# Patient Record
Sex: MALE | Race: WHITE | NOT HISPANIC OR LATINO | Employment: FULL TIME | URBAN - METROPOLITAN AREA
[De-identification: names, ages, dates, MRNs, and addresses within clinical notes are randomized per-mention and may not be internally consistent; named-entity substitution may affect disease eponyms.]

---

## 2018-05-20 ENCOUNTER — OFFICE VISIT (OUTPATIENT)
Dept: URGENT CARE | Facility: PHYSICIAN GROUP | Age: 33
End: 2018-05-20
Payer: COMMERCIAL

## 2018-05-20 VITALS
DIASTOLIC BLOOD PRESSURE: 72 MMHG | HEIGHT: 62 IN | HEART RATE: 84 BPM | WEIGHT: 179 LBS | SYSTOLIC BLOOD PRESSURE: 128 MMHG | BODY MASS INDEX: 32.94 KG/M2 | OXYGEN SATURATION: 96 % | TEMPERATURE: 98.3 F | RESPIRATION RATE: 18 BRPM

## 2018-05-20 DIAGNOSIS — J06.9 UPPER RESPIRATORY INFECTION, VIRAL: ICD-10-CM

## 2018-05-20 DIAGNOSIS — J02.9 SORE THROAT: ICD-10-CM

## 2018-05-20 DIAGNOSIS — E66.9 OBESITY (BMI 30-39.9): ICD-10-CM

## 2018-05-20 LAB
INT CON NEG: NEGATIVE
INT CON POS: POSITIVE
S PYO AG THROAT QL: NORMAL

## 2018-05-20 PROCEDURE — 87880 STREP A ASSAY W/OPTIC: CPT | Performed by: NURSE PRACTITIONER

## 2018-05-20 PROCEDURE — 99214 OFFICE O/P EST MOD 30 MIN: CPT | Performed by: NURSE PRACTITIONER

## 2018-05-20 RX ORDER — CODEINE PHOSPHATE AND GUAIFENESIN 10; 100 MG/5ML; MG/5ML
5 SOLUTION ORAL EVERY 4 HOURS PRN
Qty: 120 ML | Refills: 0 | Status: SHIPPED | OUTPATIENT
Start: 2018-05-20 | End: 2018-05-27

## 2018-05-20 RX ORDER — FLUTICASONE PROPIONATE 50 MCG
1 SPRAY, SUSPENSION (ML) NASAL DAILY
COMMUNITY

## 2018-05-20 NOTE — PROGRESS NOTES
Subjective:     Micheal Beltran is a 32 y.o. male here today for new onset upper respiratory symptoms     This is a new problem. He and his wife were on vacation and he started getting a 'cold.' Now worsening. His biggest complaint it sore throat and unable to sleep due to cough.   Symptoms include   Cough   Congestion   Sore throat   Chills/body aches  Ear pressure      Patient denies the following symptoms:  SOB   Nausea/Vomiting   Diarrhea   Sinus pressure     Onset 4 days    Progression since onset: worsening    Fever: No   Treatments tried: Dayquil, nasal spray    Family members/coworker sick with similar symptoms: no    Past Respiratory hx: No significant past medical history  Smoker status:   History   Smoking Status   • Never Smoker   Smokeless Tobacco   • Never Used       No problem-specific Assessment & Plan notes found for this encounter.       Current medicines (including changes today)  Current Outpatient Prescriptions   Medication Sig Dispense Refill   • fluticasone (FLONASE) 50 MCG/ACT nasal spray Spray 1 Spray in nose every day.     • guaifenesin-codeine (ROBITUSSIN AC) Solution oral solution Take 5 mL by mouth every four hours as needed for Cough for up to 7 days. 120 mL 0     No current facility-administered medications for this visit.        He  has no past medical history of Asthma.    He  has no past surgical history on file.     Social History     Social History   • Marital status:      Spouse name: N/A   • Number of children: N/A   • Years of education: N/A     Social History Main Topics   • Smoking status: Never Smoker   • Smokeless tobacco: Never Used   • Alcohol use Yes      Comment: occ   • Drug use: No   • Sexual activity: Yes     Other Topics Concern   • Not on file     Social History Narrative   • No narrative on file       Family History   Problem Relation Age of Onset   • No Known Problems Mother    • No Known Problems Father          ROS  Positive for   Cough  "  Congestion   Sore throat   Chills/body aches  Ear pressure    No fever, weight loss.   No rash.   No eye redness, eye pain.   Negative for SOB, wheezing.    No chest pain, palpitations, dizziness.   No abdominal pain.     All other systems reviewed and are negative.        Objective:     Blood pressure 128/72, pulse 84, temperature 36.8 °C (98.3 °F), resp. rate 18, height 1.575 m (5' 2\"), weight 81.2 kg (179 lb), SpO2 96 %. Body mass index is 32.74 kg/m².    Physical Exam:   Constitutional: Alert, no distress. Patient not toxic or lethargic.   Eye: Equal, round and reactive, conjunctiva clear, lids normal.   ENMT: Lips without lesions, oropharynx clear.   TMs normal, without erythema.   No nasal drainage, normal appearance of external nose.   No pain with palpation of sinuses   Neck: Trachea midline, no masses. No cervical or supraclavicular lymphadenopathy  Respiratory: Unlabored respiratory effort, lungs clear to auscultation, no wheezes, no ronchi.  Cardiovascular: Normal S1, S2, no murmur, no edema.   Abdomen: Soft, non-tender, no masses, no hepatosplenomegaly. Normal bowel sounds.   Skin: Warm, dry, good turgor, no rashes in visible areas.  Psych: Alert and oriented x3, normal affect and mood.        Assessment and Plan:   The following treatment plan was discussed    1. Upper respiratory infection, viral    Advised patient to rest, increase fluids   Sinus rinse as needed for congestion   Salt water gargle for sore throat as needed   Discussed s/s to seek emergent care.  RTC if symptoms worsen or do not resolve.   Consent for opiate reviewed and signed, discussed risks of medication.   - guaifenesin-codeine (ROBITUSSIN AC) Solution oral solution; Take 5 mL by mouth every four hours as needed for Cough for up to 7 days.  Dispense: 120 mL; Refill: 0  - Consent for Opiate Prescription    2. Sore throat  Neg strep   - POCT Rapid Strep A    3. Obesity (BMI 30-39.9)  Advised to f/u with PCP   - Patient identified " as having weight management issue.  Appropriate orders and counseling given.         Reviewed indication, dosage, usage and potential adverse effects of prescribed medications. Patient appears to understand, verbalizes understanding and is willing to try medications as prescribed.      Reviewed risks and benefits of treatment plan. Patient verbally agrees to plan of care.       Followup: Return if symptoms worsen or fail to improve.    KIAH Maher.     PLEASE NOTE: This dictation was created using voice recognition software. I have made every reasonable attempt to correct obvious errors, but I expect that there may be errors of grammar and possibly content that I did not discover prior finalizing this note.

## 2018-05-20 NOTE — PATIENT INSTRUCTIONS
Your medical care was provided today by: MAICO Duenas    Thank You for the opportunity to serve you.    You may receive a brief survey in the mail shortly regarding your visit today. Please take a few moments to complete the survey and return it; no postage is necessary. We are working to serve our patient population better, improve customer service and our patients overall experience and your input can help us to accomplish this. We thank you for your help and for the opportunity to serve you today and in the future.     Special Instructions:  Always call 9-1-1 immediately if you develop a life threatening emergency.    Unless told otherwise please take all medications as directed and complete prescription therapies.     Watch for the following signs that require additional evaluation: progressive lethargy or unresponsiveness, localized pain (chest, abdomen), shortness of breath, painful breathing, progressive vomiting with weakness, bloody stools, or new rash.     If you are prescribed pain medication or any other medication that is sedating, do not take medication before or while operating a vehicle or heavy machinery or equipment due to potential side effects such as drowsiness and/or dizziness.

## 2020-01-16 ENCOUNTER — TELEPHONE (OUTPATIENT)
Dept: CARDIOLOGY | Facility: MEDICAL CENTER | Age: 35
End: 2020-01-16

## 2020-01-16 NOTE — TELEPHONE ENCOUNTER
LVM for pt about a prior cardiologist and most recent labs, with request to call back if they have ever seen someone before and where they have had their last labs at.    Faxed over request to Dr Mackay office.

## 2020-01-31 ENCOUNTER — APPOINTMENT (OUTPATIENT)
Dept: CARDIOLOGY | Facility: MEDICAL CENTER | Age: 35
End: 2020-01-31
Payer: COMMERCIAL

## 2020-02-26 PROBLEM — I25.10 CAD (CORONARY ARTERY DISEASE): Status: ACTIVE | Noted: 2020-02-26

## 2021-02-24 ENCOUNTER — APPOINTMENT (OUTPATIENT)
Dept: RADIOLOGY | Facility: IMAGING CENTER | Age: 36
End: 2021-02-24
Attending: PHYSICIAN ASSISTANT
Payer: COMMERCIAL

## 2021-02-24 ENCOUNTER — OCCUPATIONAL MEDICINE (OUTPATIENT)
Dept: URGENT CARE | Facility: CLINIC | Age: 36
End: 2021-02-24
Payer: COMMERCIAL

## 2021-02-24 VITALS
SYSTOLIC BLOOD PRESSURE: 120 MMHG | OXYGEN SATURATION: 96 % | DIASTOLIC BLOOD PRESSURE: 70 MMHG | HEART RATE: 60 BPM | BODY MASS INDEX: 32.2 KG/M2 | TEMPERATURE: 98.1 F | HEIGHT: 71 IN | RESPIRATION RATE: 18 BRPM | WEIGHT: 230 LBS

## 2021-02-24 DIAGNOSIS — S89.91XA INJURY OF RIGHT KNEE, INITIAL ENCOUNTER: ICD-10-CM

## 2021-02-24 DIAGNOSIS — S86.911A STRAIN OF RIGHT KNEE, INITIAL ENCOUNTER: ICD-10-CM

## 2021-02-24 PROCEDURE — 73564 X-RAY EXAM KNEE 4 OR MORE: CPT | Mod: TC,RT | Performed by: PHYSICIAN ASSISTANT

## 2021-02-24 PROCEDURE — 99213 OFFICE O/P EST LOW 20 MIN: CPT | Performed by: PHYSICIAN ASSISTANT

## 2021-02-24 ASSESSMENT — ENCOUNTER SYMPTOMS
SHORTNESS OF BREATH: 0
SORE THROAT: 0
EYE REDNESS: 0
FEVER: 0
HEADACHES: 0
COUGH: 0
EYE DISCHARGE: 0
NAUSEA: 0
VOMITING: 0

## 2021-02-24 NOTE — LETTER
"   [facility]  [address]  [place] [address]  Phone:  [phone] - Fax:  [phone]   [facility] Progress Report and Disability Certification  Date of Service: 2/24/2021   No Show:  No  Date / Time of Next Visit: 3/3/2021   Claim Information   Patient Name: Micheal Beltran  Claim Number:     Employer:  [employer] Date of Injury: 2/18/2021     Insurer / TPA: [employer]  ID / SSN:     Occupation:   Diagnosis: Diagnoses of Injury of right knee, initial encounter and Strain of right knee, initial encounter were pertinent to this visit.    Medical Information   Related to Industrial Injury? Yes    Subjective Complaints:    The patient presents to clinic for initial Workmen's Comp. Evaluation.    DOI: 2/18/2021 - The patient states that he was kneeling on his right knee for a short period of time.  The patient states he heard a pop to his right knee upon standing.  The patient states he did not develop immediate pain following the pop to the right knee.  The patient states he gradually developed pain to the right knee as the day progressed.  The patient reports no associated swelling to the right knee.  He reports no associated bruising or redness.  The patient states the pain to his right knee started to improve/resolved.  The patient developed recurrent pain to his right knee x2-3 days ago.  The patient states the pain to his right knee is intermittent.  The patient states the pain gradually progresses throughout the day with increased use/walking.  The patient reports increased pain with walking.  The patient states the pain to his right knee improves with rest.  The patient notes a \"clicking\" sensation to the right knee with movement.  He reports no decreased range of motion.  The patient has taken OTC ibuprofen for his current symptoms.  The patient reports a prior strain of a ligament to " the right knee in 2017.  He does not work a second job.   Objective Findings:   Right Knee:  No tenderness to palpation to the right knee.  No tenderness overlying the lateral or medial joint lines.  No tenderness overlying the patella.  No swelling.  No ecchymosis.  No erythema.  No increased warmth.  ROM intact -patient demonstrates full active range of motion of the right knee  Neurovascular intact  Strength 5/5 -flexion/extension against resistance  Normal gait   Pre-Existing Condition(s):     Assessment:   Initial Visit    Status: Additional Care Required  Permanent Disability:     Plan:      Diagnostics: X-ray    Comments:       Disability Information   Status: Released to Full Duty    From:  2/24/2021  Through: 3/3/2021 Restrictions are: Temporary   Physical Restrictions   Sitting:    Standing:    Stooping:    Bending:      Squatting:    Walking:    Climbing:    Pushing:      Pulling:    Other:    Reaching Above Shoulder (L):   Reaching Above Shoulder (R):       Reaching Below Shoulder (L):    Reaching Below Shoulder (R):      Not to exceed Weight Limits   Carrying(hrs):   Weight Limit(lb):   Lifting(hrs):   Weight  Limit(lb):     Comments:   Plan:  Full Duty without Restrictions -D39 and C4 provided   --The patient did not want light duty work restrictions at this time.  OTC NSAIDs for pain/discomfort   RICE  Wear brace for additional support  Weight-bearing as tolerated  Follow-up in 1 week for re-assessment   Return to clinic or go tot he ED if symptoms worsen or fail to improve, or if the patient should develop worsening/increasing pain/tenderness, swelling, bruising, redness or warmth to the affected area, decreased ROM, numbness, tingling or weakness, difficulty walking, fever/chills, and/or any concerning symptoms.     Repetitive Actions   Hands: i.e. Fine Manipulations from Grasping:     Feet: i.e. Operating Foot Controls:     Driving / Operate Machinery:     Provider Name:   Joceline Gay P.A.-C.  Physician Signature:  Physician Name:     Clinic Name / Location: [facility]  [address]  [place] [address] Clinic Phone Number: Dept: [phone]   Appointment Time: 12:30 Pm Visit Start Time: 1:19 PM   Check-In Time:  12:32 Pm Visit Discharge Time: 2:26 PM   Original-Treating Physician or Chiropractor    Page 2-Insurer/TPA    Page 3-Employer    Page 4-Employee

## 2021-02-24 NOTE — PROGRESS NOTES
"Subjective:      Micheal Beltran is a 35 y.o. male who presents with Knee Injury (x1wk, right knee injury, was on knee most of the day, when he got up he heard a loud pop, gets more painful towards the end of the day)        HPI  The patient presents to clinic for initial Workmen's Comp. Evaluation.    DOI: 2/18/2021 - The patient states that he was kneeling on his right knee for a short period of time.  The patient states he heard a pop to his right knee upon standing.  The patient states he did not develop immediate pain following the pop to the right knee.  The patient states he gradually developed pain to the right knee as the day progressed.  The patient reports no associated swelling to the right knee.  He reports no associated bruising or redness.  The patient states the pain to his right knee started to improve/resolved.  The patient developed recurrent pain to his right knee x2-3 days ago.  The patient states the pain to his right knee is intermittent.  The patient states the pain gradually progresses throughout the day with increased use/walking.  The patient reports increased pain with walking.  The patient states the pain to his right knee improves with rest.  The patient notes a \"clicking\" sensation to the right knee with movement.  He reports no decreased range of motion.  The patient has taken OTC ibuprofen for his current symptoms.  The patient reports a prior strain of a ligament to the right knee in 2017.  He does not work a second job.    PMH: Reviewed in Epic, no pertinent findings.  MEDS: Reviewed in Epic.  ALLERGIES: Reviewed in Epic.  SURGHX: Reviewed in Epic.  SOCHX: Reviewed in Epic.  FH: Family history was reviewed, no pertinent findings to report.      Review of Systems   Constitutional: Negative for fever.   HENT: Negative for congestion, ear pain and sore throat.    Eyes: Negative for discharge and redness.   Respiratory: Negative for cough and shortness of breath.  " "  Cardiovascular: Negative for chest pain and leg swelling.   Gastrointestinal: Negative for nausea and vomiting.   Musculoskeletal: Positive for joint pain (right knee).   Skin: Negative for rash.   Neurological: Negative for headaches.          Objective:     /70 (BP Location: Left arm, Patient Position: Sitting, BP Cuff Size: Adult)   Pulse 60   Temp 36.7 °C (98.1 °F) (Temporal)   Resp 18   Ht 1.8 m (5' 10.87\")   Wt 104 kg (230 lb)   SpO2 96%   BMI 32.20 kg/m²      Physical Exam  Constitutional:       General: He is not in acute distress.     Appearance: Normal appearance. He is well-developed. He is not ill-appearing.   HENT:      Head: Normocephalic and atraumatic.      Right Ear: External ear normal.      Left Ear: External ear normal.      Nose: Nose normal.   Eyes:      Extraocular Movements: Extraocular movements intact.      Conjunctiva/sclera: Conjunctivae normal.   Cardiovascular:      Rate and Rhythm: Normal rate.   Pulmonary:      Effort: Pulmonary effort is normal.   Musculoskeletal:      Cervical back: Normal range of motion and neck supple.      Comments:   Right Knee:  No tenderness to palpation to the right knee.  No tenderness overlying the lateral or medial joint lines.  No tenderness overlying the patella.  No swelling.  No ecchymosis.  No erythema.  No increased warmth.  ROM intact -patient demonstrates full active range of motion of the right knee  Neurovascular intact  Strength 5/5 -flexion/extension against resistance  Normal gait   Skin:     General: Skin is warm and dry.   Neurological:      Mental Status: He is alert and oriented to person, place, and time.            Progress:  Right Knee XR:   COMPARISON: None     FINDINGS:  No acute fracture, malalignment, or soft tissue abnormality.   There is very early osteophyte formation on the posterior lateral patellar facet and opposing trochlea. There is mild hypertrophy of the tibial spines. There is no joint " effusion.     IMPRESSION:  No acute findings or significant arthropathy.    Reviewed x-ray results with the patient.    Provided the patient with a hinged knee brace for additional support/stabilization     Assessment/Plan:          1. Injury of right knee, initial encounter  - DX-KNEE COMPLETE 4+ RIGHT; Future    2. Strain of right knee, initial encounter    Differential diagnoses, supportive care, and indications for immediate follow-up discussed with patient.   Instructed to return to clinic or nearest emergency department for any change in condition, further concerns, or worsening of symptoms.    Plan:  Full Duty without Restrictions -D39 and C4 provided   --The patient did not want light duty work restrictions at this time.  OTC NSAIDs for pain/discomfort   RICE  Wear brace for additional support  Weight-bearing as tolerated  Follow-up in 1 week for re-assessment   Return to clinic or go tot he ED if symptoms worsen or fail to improve, or if the patient should develop worsening/increasing pain/tenderness, swelling, bruising, redness or warmth to the affected area, decreased ROM, numbness, tingling or weakness, difficulty walking, fever/chills, and/or any concerning symptoms.     Discussed plan with the patient, and he agrees to the above.    I personally reviewed prior external notes and test results pertinent to today's visit.  I have independently reviewed and interpreted all diagnostics ordered during this urgent care visit.     Time spent evaluating this patient was at least 30 minutes and includes preparing for visit, obtaining history, exam and evaluation, ordering labs/tests/procedures/medications, independent interpretation, and counseling/education.    Please note that this dictation was created using voice recognition software. I have made every reasonable attempt to correct obvious errors, but I expect that there may be errors of grammar and possibly content that I did not discover before finalizing  the note.     This note was electronically signed by Joceline Gay PA-C

## 2021-02-24 NOTE — LETTER
"EMPLOYEE’S CLAIM FOR COMPENSATION/ REPORT OF INITIAL TREATMENT  FORM C-4    EMPLOYEE’S CLAIM - PROVIDE ALL INFORMATION REQUESTED   First Name  Micheal Last Name  Devin Birthdate                    1985                Sex  male Claim Number   Home Address  [address] Age  35 y.o. Height  1.8 m (5' 10.87\") Weight  104 kg (230 lb) [facility]     [facility]  [address] Telephone  [phone] (home)    Mailing Address  [address] [facility]  [address] Primary Language Spoken  English    Insurer   Third Party   State Compensation Ins Fund   Employee's Occupation (Job Title) When Injury or Occupational Disease Occurred      Employer's Name  [employer] Telephone   [phone], EXT: [phone]   Employer Address    [address]  [facility]   [address]   Date of Injury  2/18/2021               Hour of Injury  6:05 AM Date Employer Notified  2/18/2021 Last Day of Work after Injury     or Occupational Disease  2/24/2021 Supervisor to Whom Injury     Reported  SHAILESH FLAHERTY   Address or Location of Accident (if applicable)  [[address] [place] [address]]   What were you doing at the time of accident? (if applicable)  KNEELING DOWN, THEN STANDING UP    How did this injury or occupational disease occur? (Be specific an answer in detail. Use additional sheet if necessary)  KNEELING ON GROUND, STOOD UP AND RIGHT KNEE POPPED   If you believe that you have an occupational disease, when did you first have knowledge of the disability and it relationship to your employment?  NA Witnesses to the Accident  NA      Nature of Injury or Occupational Disease  Sprain  Part(s) of Body Injured or Affected  Knee (R), ,     I certify that the above is true and correct to the best of my knowledge and that I have provided " this information in order to obtain the benefits of [place]  I hereby authorize any physician, chiropractor, surgeon, practitioner, or other person, any hospital, including [facility] or [facility], any medical service organization, any insurance company, or other institution or organization to release to each other, any medical or other information, including benefits paid or payable, pertinent to this injury or disease, except information relative to diagnosis, treatment and/or counseling for AIDS, psychological conditions, alcohol or controlled substances, for which I must give specific authorization.  A Photostat of this authorization shall be as valid as the original.     Date   Place   Employee’s Signature   THIS REPORT MUST BE COMPLETED AND MAILED WITHIN 3 WORKING DAYS OF TREATMENT   Place  [facility]  Name of Facility  [facility]   Date  2/24/2021 Diagnosis  (S89.91XA) Injury of right knee, initial encounter  (S86.911A) Strain of right knee, initial encounter Is there evidence the injured employee was under the              influence of alcohol and/or another controlled substance at the time of accident?   Hour  1:19 PM Description of Injury or Disease  Diagnoses of Injury of right knee, initial encounter and Strain of right knee, initial encounter were pertinent to this visit. No   Treatment  Plan:  Full Duty without Restrictions -D39 and C4 provided   --The patient did not want light duty work restrictions at this time.  OTC NSAIDs for pain/discomfort   RICE  Wear brace for additional support  Weight-bearing as tolerated  Follow-up in 1 week for re-assessment   Return to clinic or go tot he ED if symptoms worsen or fail to improve, or if the patient should develop worsening/increasing pain/tenderness, swelling, bruising, redness or warmth to the affected area,  "decreased ROM, numbness, tingling or weakness, difficulty walking, fever/chills, and/or any concerning symptoms.   Have you advised the patient to remain off work five days or     more? No   X-Ray Findings  Negative   If Yes   From Date  To Date      From information given by the employee, together with medical evidence, can you directly connect this injury or occupational disease as job incurred?  Yes If No Full Duty    Yes Modified Duty      Is additional medical care by a physician indicated?  Yes If Modified Duty, Specify any Limitations / Restrictions      Do you know of any previous injury or disease contributing to this condition or occupational disease?                            No   Date  2/24/2021 Print Doctor’s Name   Lucian Gay P.A.-C. I certify the employer’s copy of  this form was mailed on:   Address  [address] Use [address]     [place]  [address]    Provider’s Tax ID Number  [id number] Telephone  Dept: [phone]      e-LUCIAN Koehler P.A.-C.  Signature:     Degree          ORIGINAL-TREATING PHYSICIAN OR CHIROPRACTOR    PAGE 2-INSURER/TPA    PAGE 3-EMPLOYER    PAGE 4-EMPLOYEE        Form C-4 (rev.10/07)           BRIEF DESCRIPTION OF RIGHTS AND BENEFITS  (Pursuant to [place]RS 616C.050)    Notice of Injury or Occupational Disease (Incident Report Form C-1): If an injury or occupational disease (OD) arises out of and in the course of employment, you must provide written notice to your employer as soon as practicable, but no later than 7 days after the accident or OD. Your employer shall maintain a sufficient supply of the required forms.    Claim for Compensation (Form C-4): If medical treatment is sought, the form C-4 is available at the place of initial treatment. A completed \"Claim for Compensation\" (Form C-4) must be filed within 90 days after an accident or OD. The treating physician or chiropractor must, within 3 working days after treatment, complete and " mail to the employer, the employer's insurer and third-party , the Claim for Compensation.    Medical Treatment: If you require medical treatment for your on-the-job injury or OD, you may be required to select a physician or chiropractor from a list provided by your workers’ compensation insurer, if it has contracted with an Organization for Managed Care (MCO) or Preferred Provider Organization (PPO) or providers of health care. If your employer has not entered into a contract with an MCO or PPO, you may select a physician or chiropractor from the Panel of Physicians and Chiropractors. Any medical costs related to your industrial injury or OD will be paid by your insurer.    Temporary Total Disability (TTD): If your doctor has certified that you are unable to work for a period of at least 5 consecutive days, or 5 cumulative days in a 20-day period, or places restrictions on you that your employer does not accommodate, you may be entitled to TTD compensation.    Temporary Partial Disability (TPD): If the wage you receive upon reemployment is less than the compensation for TTD to which you are entitled, the insurer may be required to pay you TPD compensation to make up the difference. TPD can only be paid for a maximum of 24 months.    Permanent Partial Disability (PPD): When your medical condition is stable and there is an indication of a PPD as a result of your injury or OD, within 30 days, your insurer must arrange for an evaluation by a rating physician or chiropractor to determine the degree of your PPD. The amount of your PPD award depends on the date of injury, the results of the PPD evaluation, your age and wage.    Permanent Total Disability (PTD): If you are medically certified by a treating physician or chiropractor as permanently and totally disabled and have been granted a PTD status by your insurer, you are entitled to receive monthly benefits not to exceed 66 2/3% of your average monthly  wage. The amount of your PTD payments is subject to reduction if you previously received a lump-sum PPD award.    Vocational Rehabilitation Services: You may be eligible for vocational rehabilitation services if you are unable to return to the job due to a permanent physical impairment or permanent restrictions as a result of your injury or occupational disease.    Transportation and Per Renetta Reimbursement: You may be eligible for travel expenses and per renetta associated with medical treatment.    Reopening: You may be able to reopen your claim if your condition worsens after claim closure.     Appeal Process: If you disagree with a written determination issued by the insurer or the insurer does not respond to your request, you may appeal to the Department of Administration, , by following the instructions contained in your determination letter. You must appeal the determination within 70 days from the date of the determination letter at [address], [address], [place], [place] [address], or [address], [address], [place] [address]. If you disagree with the  decision, you may appeal to the Department of Administration, . You must file your appeal within 30 days from the date of the  decision letter at [address], [address], [place], [place] [address], or [address], [address], [place] [address]. If you disagree with a decision of an , you may file a petition for judicial review with the District Court. You must do so within 30 days of the Appeal Officer’s decision. You may be represented by an  at your own expense or you may contact the [facility] for possible representation.    [place]  for Injured Workers (NAIW): If you disagree with a  decision, you may request that NAIW represent you without charge at an  Hearing. For information regarding denial of  benefits, you may contact the [facility] at: [address], [address], [place] [address], [phone], or [address], [address], [place] [address], [phone]    To File a Complaint with the Division: If you wish to file a complaint with the  of the Division of Industrial Relations (DIR),  please contact the Workers’ Compensation Section, [address], [address], [place], [place] [address], telephone [phone], or [address], [address], [place], [place] [address], telephone [phone].    For assistance with Workers’ Compensation Issues: You may contact the [facility] Office for Consumer Health Assistance, [address], [address], [place], [place] [address], Toll Free 1-292.861.5873, Web site: [email or web address] E-mail: [email or web address]              __________________________________________________________________                                    _________________            Employee Name / Signature                                                                                                                            Date                                                                                                                                                                                                                              D-2 (rev. 10/20)

## 2021-03-04 ENCOUNTER — OCCUPATIONAL MEDICINE (OUTPATIENT)
Dept: URGENT CARE | Facility: CLINIC | Age: 36
End: 2021-03-04
Payer: COMMERCIAL

## 2021-03-04 VITALS
BODY MASS INDEX: 33.46 KG/M2 | HEART RATE: 66 BPM | SYSTOLIC BLOOD PRESSURE: 110 MMHG | RESPIRATION RATE: 16 BRPM | WEIGHT: 239 LBS | OXYGEN SATURATION: 99 % | TEMPERATURE: 97.5 F | DIASTOLIC BLOOD PRESSURE: 80 MMHG | HEIGHT: 71 IN

## 2021-03-04 DIAGNOSIS — S86.911D KNEE STRAIN, RIGHT, SUBSEQUENT ENCOUNTER: ICD-10-CM

## 2021-03-04 PROCEDURE — 99213 OFFICE O/P EST LOW 20 MIN: CPT | Performed by: PHYSICIAN ASSISTANT

## 2021-03-04 ASSESSMENT — ENCOUNTER SYMPTOMS
FALLS: 0
TINGLING: 0
SENSORY CHANGE: 0
JOINT SWELLING: 1

## 2021-03-04 NOTE — PROGRESS NOTES
"Subjective:      Micheal Beltran is a 35 y.o. male who presents with Knee Pain ( follow up for Rt. knee, feeling better)      DOI: 2/18/2021 - Visit #2 today.  Patient reports notable improvement in general with pain-intermittently utilizing the brace.  He is taking NSAIDs with notable improvement originally.  Continues to deny any decreased range of motion or sensation that knees can give out.  Denies limping.  He has been conducting full duty without difficulty.  Copied from original visit- \"The patient states that he was kneeling on his right knee for a short period of time.  The patient states he heard a pop to his right knee upon standing.  The patient states he did not develop immediate pain following the pop to the right knee.  The patient states he gradually developed pain to the right knee as the day progressed. \"     Knee Pain  This is a new problem. Episode onset: 2/18. The problem occurs daily. The problem has been gradually improving. Associated symptoms include joint swelling. Associated symptoms comments: Prior swelling- resolved.   . Nothing aggravates the symptoms. Treatments tried: Brace, and NSAIDs.  The treatment provided moderate relief.       Review of Systems   Musculoskeletal: Positive for joint pain and joint swelling. Negative for falls.   Neurological: Negative for tingling and sensory change.   All other systems reviewed and are negative.         Objective:     /80 (BP Location: Left arm, Patient Position: Sitting, BP Cuff Size: Adult)   Pulse 66   Temp 36.4 °C (97.5 °F) (Temporal)   Resp 16   Ht 1.803 m (5' 11\")   Wt 108 kg (239 lb)   SpO2 99%   BMI 33.33 kg/m²      PMH: No pertinent past medical history to this problem  MEDS: Medications were reviewed in Epic  ALLERGIES: Allergies were reviewed in Epic  SOCHX: Works as a .   FH: No pertinent family history to this problem    Physical Exam  Vitals reviewed.   Constitutional:       General: He is " not in acute distress.     Appearance: He is well-developed.   HENT:      Head: Normocephalic and atraumatic.   Eyes:      Conjunctiva/sclera: Conjunctivae normal.      Pupils: Pupils are equal, round, and reactive to light.   Neck:      Trachea: No tracheal deviation.   Cardiovascular:      Rate and Rhythm: Normal rate.   Pulmonary:      Effort: Pulmonary effort is normal. No respiratory distress.   Musculoskeletal:      Cervical back: Normal range of motion and neck supple.   Skin:     General: Skin is warm.   Neurological:      Mental Status: He is alert and oriented to person, place, and time.   Psychiatric:         Behavior: Behavior normal.         Thought Content: Thought content normal.         Judgment: Judgment normal.         Right knee: Without noted swelling, increased warmth or noted deformity.  Full range of motion without clicking or noted laxity in the joint.  Nontender on exam.       Assessment/Plan:        1. Knee strain, right, subsequent encounter    Continue with full duty at this time.  Patient with minimal discomfort toward the end of his shift.  Patient will continue with full duty at this time wear brace as needed, NSAIDs as needed.  Anticipate discharge full MMI at next visit.  Please see D 39 for further information.

## 2021-03-04 NOTE — LETTER
"   [facility]  [address] [facility] [address] [place] [address]  Phone:  [phone] - Fax:  [phone]   [facility] Progress Report and Disability Certification  Date of Service: 3/4/2021   No Show:  No  Date / Time of Next Visit: 3/11/2021   Claim Information   Patient Name: Micheal Beltran  Claim Number:  N/A   Employer:   [employer] Date of Injury: 2/18/2021     Insurer / TPA: State Compensation Ins Fund  ID / SSN:     Occupation:   Diagnosis: The encounter diagnosis was Knee strain, right, subsequent encounter.    Medical Information   Related to Industrial Injury? Yes    Subjective Complaints:  DOI: 2/18/2021 - Visit #2 today.  Patient reports notable improvement in general with pain-intermittently utilizing the brace.  He is taking NSAIDs with notable improvement originally.  Continues to deny any decreased range of motion or sensation that knees can give out.  Denies limping.  He has been conducting full duty without difficulty.  Copied from original visit- \"The patient states that he was kneeling on his right knee for a short period of time.  The patient states he heard a pop to his right knee upon standing.  The patient states he did not develop immediate pain following the pop to the right knee.  The patient states he gradually developed pain to the right knee as the day progressed. \"   Objective Findings: Right knee: Without noted swelling, increased warmth or noted deformity.  Full range of motion without clicking or noted laxity in the joint.  Nontender on exam.   Pre-Existing Condition(s): Prior strain in 2017.   Assessment:   Condition Improved    Status: Additional Care Required  Permanent Disability:No    Plan:      Diagnostics: X-ray  Comments:Remote knee x-ray normal    Comments:       Disability Information   Status: Released to Full Duty    From:  3/4/2021  Through: 3/11/2021 Restrictions are:     Physical " Restrictions   Sitting:    Standing:    Stooping:    Bending:      Squatting:    Walking:    Climbing:    Pushing:      Pulling:    Other:    Reaching Above Shoulder (L):   Reaching Above Shoulder (R):       Reaching Below Shoulder (L):    Reaching Below Shoulder (R):      Not to exceed Weight Limits   Carrying(hrs):   Weight Limit(lb):   Lifting(hrs):   Weight  Limit(lb):     Comments: Brace is optional at this time.  Continue with full duty.  Only utilize NSAIDs if needed.  Continue with to avoid running and spare time.  Recheck in 1 week or sooner for worsening symptoms.  Patient progressing well with noted improvement today.    Repetitive Actions   Hands: i.e. Fine Manipulations from Grasping:     Feet: i.e. Operating Foot Controls:     Driving / Operate Machinery:     Provider Name:   Yair Peck P.A.-C. Physician Signature:  Physician Name:     Clinic Name / Location: [facility]  [address] [place] [address] Clinic Phone Number: Dept: [phone]   Appointment Time: 1:00 Pm Visit Start Time: 1:09 PM   Check-In Time:  1:01 Pm Visit Discharge Time:  1:41 PM   Original-Treating Physician or Chiropractor    Page 2-Insurer/TPA    Page 3-Employer    Page 4-Employee

## 2021-03-11 ENCOUNTER — OCCUPATIONAL MEDICINE (OUTPATIENT)
Dept: URGENT CARE | Facility: CLINIC | Age: 36
End: 2021-03-11
Payer: COMMERCIAL

## 2021-03-11 VITALS
SYSTOLIC BLOOD PRESSURE: 110 MMHG | DIASTOLIC BLOOD PRESSURE: 80 MMHG | HEIGHT: 70 IN | HEART RATE: 79 BPM | OXYGEN SATURATION: 98 % | TEMPERATURE: 97.5 F | WEIGHT: 230 LBS | BODY MASS INDEX: 32.93 KG/M2 | RESPIRATION RATE: 16 BRPM

## 2021-03-11 DIAGNOSIS — S86.911D KNEE STRAIN, RIGHT, SUBSEQUENT ENCOUNTER: ICD-10-CM

## 2021-03-11 PROCEDURE — 99213 OFFICE O/P EST LOW 20 MIN: CPT | Performed by: NURSE PRACTITIONER

## 2021-03-11 ASSESSMENT — ENCOUNTER SYMPTOMS
HEADACHES: 0
NERVOUS/ANXIOUS: 0
CHILLS: 0
EYE PAIN: 0
COUGH: 0
SORE THROAT: 0
ORTHOPNEA: 0
WEAKNESS: 0
SHORTNESS OF BREATH: 0
MYALGIAS: 0
FEVER: 0
ABDOMINAL PAIN: 0
NAUSEA: 0
VOMITING: 0
DIZZINESS: 0

## 2021-03-11 NOTE — LETTER
"   [facility]  [address] [facility] [place] [address]  Phone:  [phone] - Fax:  [phone]   [facility] Progress Report and Disability Certification  Date of Service: 3/11/2021   No Show:  No  Date / Time of Next Visit:     Claim Information   Patient Name: Micheal Beltran  Claim Number:     Employer:  [employer] Date of Injury: 2/18/2021     Insurer / TPA: State Compensation [employer] Fund  ID / SSN:     Occupation:   Diagnosis: The encounter diagnosis was Knee strain, right, subsequent encounter.    Medical Information   Related to Industrial Injury? Yes    Subjective Complaints:  DOI: 2/18/2021 - Visit #3 today.  Patient reports considerable improvement in pain and range of motion.  States he will use OTC NSAIDs 3-4x/week.  Has been able to work without difficulty.       Copied from original visit- \"The patient states that he was kneeling on his right knee for a short period of time.  The patient states he heard a pop to his right knee upon standing.  The patient states he did not develop immediate pain following the pop to the right knee.  The patient states he gradually developed pain to the right knee as the day progressed. \"    Objective Findings: Right knee: Normal range of motion, no swelling, warmth, deformity.  Non-tender.  Gait is normal and unassisted.   Pre-Existing Condition(s):     Assessment:   Condition Improved    Status: Discharged /  MMI  Permanent Disability:No    Plan:      Diagnostics:      Comments:       Disability Information   Status: Released to Full Duty    From:     Through:   Restrictions are:     Physical Restrictions   Sitting:    Standing:    Stooping:    Bending:      Squatting:    Walking:    Climbing:    Pushing:      Pulling:    Other:    Reaching Above Shoulder (L):   Reaching Above Shoulder (R):       Reaching Below Shoulder (L):    Reaching Below Shoulder (R):      Not to exceed " Weight Limits   Carrying(hrs):   Weight Limit(lb):   Lifting(hrs):   Weight  Limit(lb):     Comments:      Repetitive Actions   Hands: i.e. Fine Manipulations from Grasping:     Feet: i.e. Operating Foot Controls:     Driving / Operate Machinery:     Provider Name:   TASNEEM Tong Physician Signature:  Physician Name:     Clinic Name / Location: [address] [address] Clinic Phone Number: Dept: [phone]   Appointment Time: 3:15 Pm Visit Start Time: 3:12 PM   Check-In Time:  3:04 Pm Visit Discharge Time: 4:30 PM   Original-Treating Physician or Chiropractor    Page 2-Insurer/TPA    Page 3-Employer    Page 4-Employee

## 2021-03-12 NOTE — PROGRESS NOTES
"Subjective:   Micheal Beltran is a 35 y.o. male who presents for Knee Injury (right, wc f/u, soreness)    DOI: 2/18/2021 - Visit #3 today.  Patient reports considerable improvement in pain and range of motion.  States he will use OTC NSAIDs 3-4x/week.  Has been able to work without difficulty.       Copied from original visit- \"The patient states that he was kneeling on his right knee for a short period of time.  The patient states he heard a pop to his right knee upon standing.  The patient states he did not develop immediate pain following the pop to the right knee.  The patient states he gradually developed pain to the right knee as the day progressed. \"       Review of Systems   Constitutional: Negative for chills, fever and malaise/fatigue.   HENT: Negative for congestion and sore throat.    Eyes: Negative for pain.   Respiratory: Negative for cough and shortness of breath.    Cardiovascular: Negative for chest pain, orthopnea and leg swelling.   Gastrointestinal: Negative for abdominal pain, nausea and vomiting.   Genitourinary: Negative for dysuria.   Musculoskeletal: Negative for joint pain and myalgias.   Skin: Negative for rash.   Neurological: Negative for dizziness, weakness and headaches.   Psychiatric/Behavioral: The patient is not nervous/anxious.    All other systems reviewed and are negative.      MEDS:   Current Outpatient Medications:   •  Naproxen Sodium (ALEVE PO), Take  by mouth., Disp: , Rfl:   •  IBUPROFEN PO, Take  by mouth., Disp: , Rfl:   •  acetaminophen (TYLENOL) 500 MG Tab, Take 2 Tabs by mouth every 8 hours., Disp: 30 Tab, Rfl: 0  •  fluticasone (FLONASE) 50 MCG/ACT nasal spray, Spray 1 Spray in nose every day., Disp: , Rfl:   ALLERGIES: No Known Allergies    Patient's PMH, SocHx, SurgHx, FamHx, Drug allergies and medications were reviewed.     Objective:   /80 (BP Location: Left arm, Patient Position: Sitting, BP Cuff Size: Large adult)   Pulse 79   Temp 36.4 °C (97.5 " "°F) (Temporal)   Resp 16   Ht 1.778 m (5' 10\")   Wt 104 kg (230 lb)   SpO2 98%   BMI 33.00 kg/m²     Physical Exam  Vitals and nursing note reviewed.   Constitutional:       General: He is awake.      Appearance: Normal appearance. He is well-developed.   HENT:      Head: Normocephalic and atraumatic.      Right Ear: External ear normal.      Left Ear: External ear normal.      Nose: Nose normal.      Mouth/Throat:      Lips: Pink.      Mouth: Mucous membranes are moist.      Pharynx: Oropharynx is clear.   Eyes:      General: Lids are normal.      Extraocular Movements: Extraocular movements intact.      Conjunctiva/sclera: Conjunctivae normal.   Cardiovascular:      Rate and Rhythm: Normal rate and regular rhythm.   Pulmonary:      Effort: Pulmonary effort is normal.   Musculoskeletal:         General: Normal range of motion.      Cervical back: Normal range of motion.   Skin:     General: Skin is warm and dry.   Neurological:      Mental Status: He is alert and oriented to person, place, and time.   Psychiatric:         Mood and Affect: Mood normal.         Behavior: Behavior normal. Behavior is cooperative.         Thought Content: Thought content normal.         Judgment: Judgment normal.       Right knee: Normal range of motion, no swelling, warmth, deformity.  Non-tender.  Gait is normal and unassisted.      Assessment/Plan:   Assessment    1. Knee strain, right, subsequent encounter    See D39.    Released to full duty without restrictions.  All questions answered and patient recent plan of care.  "